# Patient Record
Sex: FEMALE | Race: BLACK OR AFRICAN AMERICAN
[De-identification: names, ages, dates, MRNs, and addresses within clinical notes are randomized per-mention and may not be internally consistent; named-entity substitution may affect disease eponyms.]

---

## 2018-12-03 PROBLEM — Z00.00 ENCOUNTER FOR PREVENTIVE HEALTH EXAMINATION: Status: ACTIVE | Noted: 2018-12-03

## 2019-06-20 ENCOUNTER — APPOINTMENT (OUTPATIENT)
Dept: VASCULAR SURGERY | Facility: CLINIC | Age: 69
End: 2019-06-20
Payer: MEDICARE

## 2019-06-20 VITALS
BODY MASS INDEX: 26.36 KG/M2 | HEIGHT: 66 IN | TEMPERATURE: 98.1 F | SYSTOLIC BLOOD PRESSURE: 145 MMHG | HEART RATE: 71 BPM | WEIGHT: 164 LBS | DIASTOLIC BLOOD PRESSURE: 87 MMHG

## 2019-06-20 DIAGNOSIS — I65.23 OCCLUSION AND STENOSIS OF BILATERAL CAROTID ARTERIES: ICD-10-CM

## 2019-06-20 PROCEDURE — 93880 EXTRACRANIAL BILAT STUDY: CPT

## 2019-06-20 PROCEDURE — 99203 OFFICE O/P NEW LOW 30 MIN: CPT

## 2020-06-25 ENCOUNTER — APPOINTMENT (OUTPATIENT)
Dept: VASCULAR SURGERY | Facility: CLINIC | Age: 70
End: 2020-06-25

## 2020-07-06 ENCOUNTER — APPOINTMENT (OUTPATIENT)
Dept: VASCULAR SURGERY | Facility: CLINIC | Age: 70
End: 2020-07-06
Payer: MEDICARE

## 2020-07-06 PROCEDURE — 93970 EXTREMITY STUDY: CPT

## 2020-07-06 PROCEDURE — 93978 VASCULAR STUDY: CPT

## 2020-07-10 ENCOUNTER — APPOINTMENT (OUTPATIENT)
Dept: VASCULAR SURGERY | Facility: CLINIC | Age: 70
End: 2020-07-10
Payer: MEDICARE

## 2020-07-10 DIAGNOSIS — Z86.39 PERSONAL HISTORY OF OTHER ENDOCRINE, NUTRITIONAL AND METABOLIC DISEASE: ICD-10-CM

## 2020-07-10 DIAGNOSIS — Z86.79 PERSONAL HISTORY OF OTHER DISEASES OF THE CIRCULATORY SYSTEM: ICD-10-CM

## 2020-07-10 DIAGNOSIS — Z87.891 PERSONAL HISTORY OF NICOTINE DEPENDENCE: ICD-10-CM

## 2020-07-10 DIAGNOSIS — M79.89 OTHER SPECIFIED SOFT TISSUE DISORDERS: ICD-10-CM

## 2020-07-10 PROCEDURE — 99447 NTRPROF PH1/NTRNET/EHR 11-20: CPT

## 2020-07-10 RX ORDER — AMLODIPINE BESYLATE 5 MG/1
TABLET ORAL
Refills: 0 | Status: ACTIVE | COMMUNITY

## 2020-07-10 RX ORDER — ERGOCALCIFEROL 1.25 MG/1
CAPSULE ORAL
Refills: 0 | Status: ACTIVE | COMMUNITY

## 2020-07-10 RX ORDER — OLIVE LEAF EXTRACT 250 MG
CAPSULE ORAL
Refills: 0 | Status: ACTIVE | COMMUNITY

## 2020-07-10 RX ORDER — ATORVASTATIN CALCIUM 80 MG/1
TABLET, FILM COATED ORAL
Refills: 0 | Status: ACTIVE | COMMUNITY

## 2020-07-13 NOTE — ASSESSMENT
[FreeTextEntry1] : SAMRA JAMISON is a 70 year old female with h/o LE swelling. No evidence of venous insufficiency or blood clots. Past vascular w/u have otherwise been unremarkable. Patient is doing well CS and does not have any other active vascular issues. Recommend f/u as needed.

## 2020-07-13 NOTE — REVIEW OF SYSTEMS
[Lower Ext Edema] : lower extremity edema [Negative] : Constitutional [Chest Pain] : no chest pain [Shortness Of Breath] : no shortness of breath [Abdominal Pain] : no abdominal pain [Leg Claudication] : no intermittent leg claudication [Limb Weakness] : no limb weakness [Difficulty Walking] : no difficulty walking

## 2020-07-13 NOTE — HISTORY OF PRESENT ILLNESS
[Medical Office: ([facility])___] : at the medical office located in  [Home] : at home, [unfilled] , at the time of the visit. [Verbal consent obtained from patient] : the patient, [unfilled] [FreeTextEntry1] : SAMRA JAMISON is a 70 year old female, former patient of Dr. Ventura, with a h/o LE swelling. Today she reports ankle swelling for past 2 months. She has been wearing CS with some improvement in symptoms. She also take horse chestnut supplements which reportedly help with swelling/discomfort. She otherwise denies leg pain, difficulty walking, wounds, and h/o DVT. No FH of aneurysms. \par \par Her recent aortoiliac ultrasound was negative for aneurysms and atherosclerotic lesions. Bilateral VLE was negative for DVT/SVT/reflux. Previous carotid duplex at our office with mild disease bilaterally.

## 2022-12-05 ENCOUNTER — OFFICE (OUTPATIENT)
Dept: URBAN - METROPOLITAN AREA CLINIC 90 | Facility: CLINIC | Age: 72
Setting detail: OPHTHALMOLOGY
End: 2022-12-05
Payer: MEDICARE

## 2022-12-05 DIAGNOSIS — H11.133: ICD-10-CM

## 2022-12-05 DIAGNOSIS — H10.45: ICD-10-CM

## 2022-12-05 DIAGNOSIS — H43.391: ICD-10-CM

## 2022-12-05 DIAGNOSIS — H40.013: ICD-10-CM

## 2022-12-05 DIAGNOSIS — H16.223: ICD-10-CM

## 2022-12-05 DIAGNOSIS — H02.834: ICD-10-CM

## 2022-12-05 DIAGNOSIS — H25.13: ICD-10-CM

## 2022-12-05 DIAGNOSIS — H02.831: ICD-10-CM

## 2022-12-05 DIAGNOSIS — H35.373: ICD-10-CM

## 2022-12-05 PROCEDURE — 92014 COMPRE OPH EXAM EST PT 1/>: CPT | Performed by: OPHTHALMOLOGY

## 2022-12-05 PROCEDURE — 92250 FUNDUS PHOTOGRAPHY W/I&R: CPT | Performed by: OPHTHALMOLOGY

## 2022-12-05 ASSESSMENT — VISUAL ACUITY
OS_BCVA: 20/50-1
OD_BCVA: 20/25-2

## 2022-12-05 ASSESSMENT — REFRACTION_CURRENTRX
OS_ADD: +3.00
OS_SPHERE: +0.25
OS_AXIS: 121
OD_VPRISM_DIRECTION: PROGS
OD_CYLINDER: -1.00
OS_AXIS: 091
OD_AXIS: 003
OS_CYLINDER: SPHERE
OD_SPHERE: -7.75
OD_ADD: +2.25
OS_ADD: +3.00
OS_ADD: -2.50
OD_OVR_VA: 20/
OD_VPRISM_DIRECTION: PROGS
OD_VPRISM_DIRECTION: PROGS
OS_VPRISM_DIRECTION: PROGS
OD_ADD: +3.25
OD_AXIS: 030
OS_AXIS: 107
OD_CYLINDER: -0.50
OD_CYLINDER: -0.75
OS_ADD: +3.00
OD_AXIS: 178
OD_SPHERE: -7.50
OS_CYLINDER: -0.25
OD_CYLINDER: -0.50
OS_OVR_VA: 20/
OD_OVR_VA: 20/
OD_OVR_VA: 20/
OS_SPHERE: -6.00
OD_ADD: +3.00
OS_OVR_VA: 20/
OD_SPHERE: -7.75
OS_CYLINDER: -0.25
OS_VPRISM_DIRECTION: PROGS
OD_SPHERE: -7.50
OS_VPRISM_DIRECTION: PROGS
OS_SPHERE: -6.00
OS_VPRISM_DIRECTION: PROGS
OD_AXIS: 169
OD_ADD: +2.75
OS_SPHERE: -6.00
OS_OVR_VA: 20/
OS_CYLINDER: -0.25
OD_VPRISM_DIRECTION: PROGS

## 2022-12-05 ASSESSMENT — SPHEQUIV_DERIVED
OS_SPHEQUIV: -3.5
OD_SPHEQUIV: -4.75
OD_SPHEQUIV: -4.375
OD_SPHEQUIV: -7.125
OD_SPHEQUIV: -6.875
OS_SPHEQUIV: -6.375
OS_SPHEQUIV: -5.75
OD_SPHEQUIV: -7.75

## 2022-12-05 ASSESSMENT — REFRACTION_MANIFEST
OD_AXIS: 15
OD_CYLINDER: -0.50
OD_AXIS: 015
OS_SPHERE: -6.25
OD_VA1: 20/20
OD_ADD: +2.75
OS_VA1: 20/20-1
OS_SPHERE: -2.75
OD_CYLINDER: -0.50
OS_CYLINDER: SPHERE
OS_ADD: +3.00
OS_AXIS: 110
OD_AXIS: 010
OS_AXIS: 110
OS_SPHERE: -6.00
OD_SPHERE: -7.50
OD_VA1: 20/25+1
OS_SPHERE: -3.25
OU_VA: 20/20
OD_CYLINDER: -0.75
OU_VA: 20/20
OS_VA1: 20/20
OS_CYLINDER: -0.50
OD_SPHERE: -6.75
OD_AXIS: 010
OD_SPHERE: -4.00
OD_CYLINDER: -0.75
OS_CYLINDER: -0.25
OD_SPHERE: -4.50

## 2022-12-05 ASSESSMENT — SUPERFICIAL PUNCTATE KERATITIS (SPK)
OS_SPK: 1+ 2+
OD_SPK: 1+ 2+

## 2022-12-05 ASSESSMENT — PACHYMETRY
OD_CT_UM: 617
OD_CT_CORRECTION: -5
OS_CT_CORRECTION: -3
OS_CT_UM: 587

## 2022-12-05 ASSESSMENT — KERATOMETRY
OD_K1POWER_DIOPTERS: 43.00
OD_AXISANGLE_DEGREES: 082
OS_K1POWER_DIOPTERS: 43.50
OS_AXISANGLE_DEGREES: 125
OD_K2POWER_DIOPTERS: 44.25
METHOD_AUTO_MANUAL: AUTO
OS_K2POWER_DIOPTERS: 44.00

## 2022-12-05 ASSESSMENT — AXIALLENGTH_DERIVED
OS_AL: 24.9362
OD_AL: 25.5461
OD_AL: 25.3759
OD_AL: 26.5552
OS_AL: 25.9555
OD_AL: 26.9943
OS_AL: 26.2537
OD_AL: 26.6792

## 2022-12-05 ASSESSMENT — LID POSITION - DERMATOCHALASIS
OS_DERMATOCHALASIS: LUL
OD_DERMATOCHALASIS: RUL

## 2022-12-05 ASSESSMENT — CONFRONTATIONAL VISUAL FIELD TEST (CVF)
OS_FINDINGS: FULL
OD_FINDINGS: FULL

## 2022-12-05 ASSESSMENT — TONOMETRY
OS_IOP_MMHG: 15
OD_IOP_MMHG: 14

## 2022-12-05 ASSESSMENT — REFRACTION_AUTOREFRACTION
OD_AXIS: 018
OS_SPHERE: -5.25
OS_CYLINDER: -1.00
OD_SPHERE: -6.50
OS_AXIS: 101
OD_CYLINDER: -0.75

## 2023-06-09 ENCOUNTER — OFFICE (OUTPATIENT)
Dept: URBAN - METROPOLITAN AREA CLINIC 90 | Facility: CLINIC | Age: 73
Setting detail: OPHTHALMOLOGY
End: 2023-06-09
Payer: MEDICARE

## 2023-06-09 DIAGNOSIS — H40.013: ICD-10-CM

## 2023-06-09 DIAGNOSIS — H11.133: ICD-10-CM

## 2023-06-09 DIAGNOSIS — H16.223: ICD-10-CM

## 2023-06-09 DIAGNOSIS — H10.45: ICD-10-CM

## 2023-06-09 DIAGNOSIS — H25.13: ICD-10-CM

## 2023-06-09 DIAGNOSIS — H02.831: ICD-10-CM

## 2023-06-09 DIAGNOSIS — H02.834: ICD-10-CM

## 2023-06-09 PROCEDURE — 92083 EXTENDED VISUAL FIELD XM: CPT | Performed by: OPHTHALMOLOGY

## 2023-06-09 PROCEDURE — 92133 CPTRZD OPH DX IMG PST SGM ON: CPT | Performed by: OPHTHALMOLOGY

## 2023-06-09 PROCEDURE — 92020 GONIOSCOPY: CPT | Performed by: OPHTHALMOLOGY

## 2023-06-09 PROCEDURE — 92012 INTRM OPH EXAM EST PATIENT: CPT | Performed by: OPHTHALMOLOGY

## 2023-06-09 ASSESSMENT — LID POSITION - DERMATOCHALASIS
OD_DERMATOCHALASIS: RUL
OS_DERMATOCHALASIS: LUL

## 2023-06-09 ASSESSMENT — CONFRONTATIONAL VISUAL FIELD TEST (CVF)
OS_FINDINGS: FULL
OD_FINDINGS: FULL

## 2023-06-09 ASSESSMENT — TONOMETRY
OS_IOP_MMHG: 17
OD_IOP_MMHG: 16

## 2023-06-09 ASSESSMENT — PACHYMETRY
OD_CT_CORRECTION: -5
OD_CT_UM: 617
OS_CT_UM: 587
OS_CT_CORRECTION: -3

## 2023-06-10 ASSESSMENT — REFRACTION_MANIFEST
OS_SPHERE: -6.25
OS_SPHERE: -2.75
OD_AXIS: 010
OS_CYLINDER: -0.25
OD_AXIS: 15
OS_SPHERE: -6.00
OD_AXIS: 15
OD_ADD: +2.75
OD_CYLINDER: -0.75
OD_CYLINDER: -0.50
OU_VA: 20/20
OS_AXIS: 110
OD_VA1: 20/25+1
OS_VA1: 20/20-1
OD_CYLINDER: -0.50
OS_CYLINDER: SPHERE
OD_VA1: 20/20
OD_SPHERE: -4.00
OD_SPHERE: -7.50
OS_SPHERE: -3.25
OD_SPHERE: -6.75
OS_CYLINDER: SPH
OD_SPHERE: -7.50
OD_AXIS: 010
OS_VA1: 20/20
OS_AXIS: 110
OD_CYLINDER: -0.75
OD_SPHERE: -4.50
OS_SPHERE: -6.00
OS_CYLINDER: -0.50
OD_AXIS: 015
OD_CYLINDER: -0.50
OU_VA: 20/20
OS_ADD: +3.00

## 2023-06-10 ASSESSMENT — REFRACTION_CURRENTRX
OD_CYLINDER: -0.75
OD_SPHERE: -7.75
OS_VPRISM_DIRECTION: PROGS
OS_ADD: -2.50
OS_CYLINDER: -0.25
OD_AXIS: 178
OS_OVR_VA: 20/
OS_OVR_VA: 20/
OD_AXIS: 169
OD_VPRISM_DIRECTION: PROGS
OD_CYLINDER: -0.50
OS_VPRISM_DIRECTION: PROGS
OD_VPRISM_DIRECTION: PROGS
OD_ADD: +2.75
OS_AXIS: 107
OD_OVR_VA: 20/
OD_AXIS: 003
OS_SPHERE: +0.25
OD_VPRISM_DIRECTION: PROGS
OS_CYLINDER: -0.25
OD_SPHERE: -7.50
OD_SPHERE: -7.50
OS_ADD: +3.00
OS_ADD: +3.00
OD_ADD: +3.00
OS_ADD: +3.00
OS_OVR_VA: 20/
OS_VPRISM_DIRECTION: PROGS
OD_CYLINDER: -0.50
OS_CYLINDER: SPHERE
OD_CYLINDER: -1.00
OS_AXIS: 121
OS_SPHERE: -6.00
OD_OVR_VA: 20/
OD_SPHERE: -7.75
OD_AXIS: 030
OS_SPHERE: -6.00
OD_ADD: +2.25
OS_AXIS: 091
OD_ADD: +3.25
OD_VPRISM_DIRECTION: PROGS
OS_VPRISM_DIRECTION: PROGS
OS_SPHERE: -6.00
OS_CYLINDER: -0.25
OD_OVR_VA: 20/

## 2023-06-10 ASSESSMENT — REFRACTION_AUTOREFRACTION
OS_CYLINDER: -1.00
OD_AXIS: 018
OS_AXIS: 101
OD_CYLINDER: -0.75
OS_SPHERE: -5.25
OD_SPHERE: -6.50

## 2023-06-10 ASSESSMENT — SPHEQUIV_DERIVED
OD_SPHEQUIV: -7.75
OD_SPHEQUIV: -6.875
OD_SPHEQUIV: -4.75
OD_SPHEQUIV: -7.125
OD_SPHEQUIV: -4.375
OS_SPHEQUIV: -6.375
OS_SPHEQUIV: -3.5
OS_SPHEQUIV: -5.75
OD_SPHEQUIV: -7.75

## 2023-06-10 ASSESSMENT — KERATOMETRY
OD_K1POWER_DIOPTERS: 43.00
OS_K2POWER_DIOPTERS: 44.00
OS_K1POWER_DIOPTERS: 43.50
OS_AXISANGLE_DEGREES: 125
OD_K2POWER_DIOPTERS: 44.25
METHOD_AUTO_MANUAL: AUTO
OD_AXISANGLE_DEGREES: 082

## 2023-06-10 ASSESSMENT — VISUAL ACUITY
OD_BCVA: 20/25
OS_BCVA: 20/30-2

## 2023-06-10 ASSESSMENT — AXIALLENGTH_DERIVED
OD_AL: 26.9943
OS_AL: 25.9555
OS_AL: 26.2537
OD_AL: 26.5552
OD_AL: 26.9943
OD_AL: 26.6792
OS_AL: 24.9362
OD_AL: 25.3759
OD_AL: 25.5461

## 2023-12-13 ENCOUNTER — OFFICE (OUTPATIENT)
Dept: URBAN - METROPOLITAN AREA CLINIC 90 | Facility: CLINIC | Age: 73
Setting detail: OPHTHALMOLOGY
End: 2023-12-13
Payer: MEDICARE

## 2023-12-13 DIAGNOSIS — H02.834: ICD-10-CM

## 2023-12-13 DIAGNOSIS — H40.013: ICD-10-CM

## 2023-12-13 DIAGNOSIS — H02.831: ICD-10-CM

## 2023-12-13 DIAGNOSIS — H35.373: ICD-10-CM

## 2023-12-13 DIAGNOSIS — H11.133: ICD-10-CM

## 2023-12-13 DIAGNOSIS — H25.13: ICD-10-CM

## 2023-12-13 DIAGNOSIS — H43.393: ICD-10-CM

## 2023-12-13 DIAGNOSIS — H16.223: ICD-10-CM

## 2023-12-13 PROCEDURE — 92014 COMPRE OPH EXAM EST PT 1/>: CPT | Performed by: OPHTHALMOLOGY

## 2023-12-13 PROCEDURE — 92250 FUNDUS PHOTOGRAPHY W/I&R: CPT | Performed by: OPHTHALMOLOGY

## 2023-12-13 ASSESSMENT — LID POSITION - DERMATOCHALASIS
OD_DERMATOCHALASIS: RUL
OS_DERMATOCHALASIS: LUL

## 2023-12-13 ASSESSMENT — REFRACTION_CURRENTRX
OS_SPHERE: -6.00
OS_ADD: +3.00
OD_OVR_VA: 20/
OD_CYLINDER: -0.50
OD_ADD: +2.75
OS_SPHERE: +0.25
OS_ADD: -2.50
OS_CYLINDER: -0.25
OS_VPRISM_DIRECTION: PROGS
OS_SPHERE: -6.00
OS_SPHERE: -6.25
OS_CYLINDER: -0.25
OD_SPHERE: -7.50
OS_AXIS: 107
OS_OVR_VA: 20/
OD_ADD: +2.25
OD_CYLINDER: -0.50
OD_VPRISM_DIRECTION: PROGS
OS_VPRISM_DIRECTION: PROGS
OD_ADD: +3.25
OS_ADD: +3.75
OD_AXIS: 010
OD_VPRISM_DIRECTION: PROGS
OS_OVR_VA: 20/
OS_VPRISM_DIRECTION: PROGS
OD_SPHERE: -7.75
OS_VPRISM_DIRECTION: PROGS
OD_AXIS: 003
OS_AXIS: 180
OD_CYLINDER: -0.50
OD_VPRISM_DIRECTION: PROGS
OS_ADD: +3.00
OD_VPRISM_DIRECTION: PROGS
OD_AXIS: 169
OS_OVR_VA: 20/
OD_OVR_VA: 20/
OS_AXIS: 121
OS_CYLINDER: SPH
OS_CYLINDER: -0.25
OD_OVR_VA: 20/
OD_ADD: +3.75
OD_CYLINDER: -0.75
OS_AXIS: 091
OD_AXIS: 030
OD_SPHERE: -7.75
OD_SPHERE: -7.50

## 2023-12-13 ASSESSMENT — REFRACTION_MANIFEST
OS_SPHERE: -2.75
OU_VA: 20/20
OS_AXIS: 110
OS_SPHERE: -6.00
OS_CYLINDER: -0.25
OD_VA1: 20/20
OS_CYLINDER: SPH
OD_AXIS: 015
OS_AXIS: 110
OU_VA: 20/20
OD_AXIS: 15
OS_VA1: 20/20-1
OS_SPHERE: -6.00
OD_CYLINDER: -0.50
OD_CYLINDER: -0.50
OD_SPHERE: -7.50
OD_SPHERE: -4.00
OS_ADD: +3.00
OS_SPHERE: -6.00
OD_VA1: 20/25+1
OD_CYLINDER: -0.75
OD_AXIS: 15
OS_SPHERE: -6.25
OS_CYLINDER: SPHERE
OD_CYLINDER: -0.50
OD_ADD: +2.75
OS_ADD: +3.00
OD_SPHERE: -4.50
OS_VA1: 20/20
OD_AXIS: 010
OD_AXIS: 15
OD_SPHERE: -7.50
OD_SPHERE: -7.50
OS_SPHERE: -3.25
OD_CYLINDER: -0.75
OD_CYLINDER: -0.50
OS_CYLINDER: -0.50
OD_SPHERE: -6.75
OD_AXIS: 010
OD_ADD: +2.75

## 2023-12-13 ASSESSMENT — SPHEQUIV_DERIVED
OS_SPHEQUIV: -6.375
OD_SPHEQUIV: -7
OS_SPHEQUIV: -3.5
OD_SPHEQUIV: -7.125
OD_SPHEQUIV: -4.75
OS_SPHEQUIV: -5.625
OD_SPHEQUIV: -7.75
OD_SPHEQUIV: -7.75
OD_SPHEQUIV: -4.375
OD_SPHEQUIV: -7.75

## 2023-12-13 ASSESSMENT — REFRACTION_AUTOREFRACTION
OS_CYLINDER: -0.75
OD_CYLINDER: -1.00
OD_SPHERE: -6.50
OD_AXIS: 009
OS_AXIS: 105
OS_SPHERE: -5.25

## 2023-12-13 ASSESSMENT — CONFRONTATIONAL VISUAL FIELD TEST (CVF)
OS_FINDINGS: FULL
OD_FINDINGS: FULL

## 2024-06-21 ENCOUNTER — OFFICE (OUTPATIENT)
Dept: URBAN - METROPOLITAN AREA CLINIC 90 | Facility: CLINIC | Age: 74
Setting detail: OPHTHALMOLOGY
End: 2024-06-21
Payer: MEDICARE

## 2024-06-21 DIAGNOSIS — H35.373: ICD-10-CM

## 2024-06-21 DIAGNOSIS — H11.133: ICD-10-CM

## 2024-06-21 DIAGNOSIS — H40.013: ICD-10-CM

## 2024-06-21 DIAGNOSIS — H02.834: ICD-10-CM

## 2024-06-21 DIAGNOSIS — H43.393: ICD-10-CM

## 2024-06-21 DIAGNOSIS — H16.223: ICD-10-CM

## 2024-06-21 DIAGNOSIS — H02.831: ICD-10-CM

## 2024-06-21 DIAGNOSIS — H25.13: ICD-10-CM

## 2024-06-21 PROCEDURE — 92014 COMPRE OPH EXAM EST PT 1/>: CPT | Performed by: OPHTHALMOLOGY

## 2024-06-21 PROCEDURE — 92133 CPTRZD OPH DX IMG PST SGM ON: CPT | Performed by: OPHTHALMOLOGY

## 2024-06-21 PROCEDURE — 92083 EXTENDED VISUAL FIELD XM: CPT | Performed by: OPHTHALMOLOGY

## 2024-06-21 ASSESSMENT — LID POSITION - DERMATOCHALASIS
OD_DERMATOCHALASIS: RUL
OS_DERMATOCHALASIS: LUL

## 2024-06-21 ASSESSMENT — CONFRONTATIONAL VISUAL FIELD TEST (CVF)
OS_FINDINGS: FULL
OD_FINDINGS: FULL

## 2024-12-23 ENCOUNTER — OFFICE (OUTPATIENT)
Facility: LOCATION | Age: 74
Setting detail: OPHTHALMOLOGY
End: 2024-12-23
Payer: MEDICARE

## 2024-12-23 DIAGNOSIS — H02.831: ICD-10-CM

## 2024-12-23 DIAGNOSIS — H35.373: ICD-10-CM

## 2024-12-23 DIAGNOSIS — H11.133: ICD-10-CM

## 2024-12-23 DIAGNOSIS — H02.834: ICD-10-CM

## 2024-12-23 DIAGNOSIS — H43.393: ICD-10-CM

## 2024-12-23 DIAGNOSIS — H25.13: ICD-10-CM

## 2024-12-23 DIAGNOSIS — H40.013: ICD-10-CM

## 2024-12-23 DIAGNOSIS — H16.223: ICD-10-CM

## 2024-12-23 PROBLEM — H43.391 VITREOUS FLOATERS; RIGHT EYE: Status: ACTIVE | Noted: 2024-12-23

## 2024-12-23 PROCEDURE — 92250 FUNDUS PHOTOGRAPHY W/I&R: CPT | Performed by: OPHTHALMOLOGY

## 2024-12-23 PROCEDURE — 92014 COMPRE OPH EXAM EST PT 1/>: CPT | Performed by: OPHTHALMOLOGY

## 2024-12-23 ASSESSMENT — REFRACTION_CURRENTRX
OD_SPHERE: -7.75
OD_VPRISM_DIRECTION: PROGS
OD_AXIS: 010
OS_OVR_VA: 20/
OS_CYLINDER: -0.25
OS_SPHERE: -6.00
OD_ADD: +2.75
OD_OVR_VA: 20/
OS_ADD: +2.50
OS_SPHERE: -6.00
OD_VPRISM_DIRECTION: PROGS
OS_VPRISM_DIRECTION: PROGS
OS_AXIS: 180
OS_SPHERE: +0.25
OD_OVR_VA: 20/
OD_SPHERE: -7.75
OD_CYLINDER: -0.50
OD_AXIS: 013
OS_CYLINDER: SPH
OS_AXIS: 107
OS_AXIS: 091
OD_SPHERE: -7.50
OD_ADD: +2.50
OD_OVR_VA: 20/
OD_CYLINDER: -0.50
OD_AXIS: 169
OS_CYLINDER: -0.25
OS_VPRISM_DIRECTION: PROGS
OS_SPHERE: -6.25
OS_ADD: +3.75
OD_SPHERE: -7.50
OD_SPHERE: -7.75
OD_CYLINDER: -0.50
OS_ADD: -2.50
OD_ADD: +3.75
OD_CYLINDER: -0.75
OD_ADD: +2.25
OS_VPRISM_DIRECTION: PROGS
OD_AXIS: 030
OD_VPRISM_DIRECTION: PROGS
OS_VPRISM_DIRECTION: PROGS
OS_CYLINDER: -SPH
OS_CYLINDER: -0.25
OS_SPHERE: -6.00
OD_ADD: +3.25
OD_VPRISM_DIRECTION: PROGS
OS_OVR_VA: 20/
OS_ADD: +3.00
OD_CYLINDER: -0.75
OD_AXIS: 003
OS_OVR_VA: 20/
OS_AXIS: 121
OS_ADD: +3.00

## 2024-12-23 ASSESSMENT — REFRACTION_MANIFEST
OD_ADD: +2.75
OS_ADD: +3.00
OD_CYLINDER: -0.75
OD_ADD: +2.75
OU_VA: 20/20
OS_CYLINDER: -0.50
OS_VA1: 20/20
OD_SPHERE: -6.75
OD_CYLINDER: -0.50
OD_SPHERE: -7.50
OS_SPHERE: -6.00
OS_SPHERE: -6.00
OD_SPHERE: -4.00
OD_CYLINDER: -0.50
OS_CYLINDER: SPHERE
OU_VA: 20/20
OS_AXIS: 110
OD_AXIS: 15
OD_AXIS: 010
OD_SPHERE: -7.50
OD_CYLINDER: -0.50
OD_SPHERE: -7.50
OD_VA1: 20/20
OS_CYLINDER: -0.25
OD_SPHERE: -7.50
OD_AXIS: 15
OD_AXIS: 15
OS_VA1: 20/20-1
OD_CYLINDER: -0.75
OS_AXIS: 110
OD_AXIS: 015
OD_SPHERE: -4.50
OD_CYLINDER: -0.50
OS_SPHERE: -2.75
OS_SPHERE: -3.25
OS_SPHERE: -6.00
OS_SPHERE: -6.00
OS_ADD: +3.00
OS_CYLINDER: SPH
OD_AXIS: 010
OD_VA1: 20/25+1
OS_SPHERE: -6.25
OD_ADD: +2.75
OD_CYLINDER: -0.50
OD_AXIS: 15
OS_ADD: +3.00

## 2024-12-23 ASSESSMENT — CONFRONTATIONAL VISUAL FIELD TEST (CVF)
OS_FINDINGS: FULL
OD_FINDINGS: FULL

## 2024-12-23 ASSESSMENT — REFRACTION_AUTOREFRACTION
OS_AXIS: 107
OD_AXIS: 018
OS_SPHERE: -5.00
OD_SPHERE: -6.50
OD_CYLINDER: -0.75
OS_CYLINDER: -1.00

## 2024-12-23 ASSESSMENT — KERATOMETRY
OS_K2POWER_DIOPTERS: 44.25
OD_K2POWER_DIOPTERS: 44.25
OD_AXISANGLE_DEGREES: 083
OD_K1POWER_DIOPTERS: 43.25
METHOD_AUTO_MANUAL: AUTO
OS_K1POWER_DIOPTERS: 43.75
OS_AXISANGLE_DEGREES: 161

## 2024-12-23 ASSESSMENT — SUPERFICIAL PUNCTATE KERATITIS (SPK)
OD_SPK: T
OS_SPK: T

## 2024-12-23 ASSESSMENT — TONOMETRY
OD_IOP_MMHG: 19
OS_IOP_MMHG: 18
OS_IOP_MMHG: 19
OD_IOP_MMHG: 18

## 2024-12-23 ASSESSMENT — PACHYMETRY
OD_CT_CORRECTION: -5
OD_CT_UM: 617
OS_CT_CORRECTION: -3
OS_CT_UM: 587

## 2024-12-23 ASSESSMENT — LID POSITION - DERMATOCHALASIS
OD_DERMATOCHALASIS: RUL
OS_DERMATOCHALASIS: LUL

## 2024-12-23 ASSESSMENT — VISUAL ACUITY
OD_BCVA: 20/20-2
OS_BCVA: 20/25

## 2025-06-05 ENCOUNTER — APPOINTMENT (OUTPATIENT)
Dept: VASCULAR SURGERY | Facility: CLINIC | Age: 75
End: 2025-06-05
Payer: MEDICARE

## 2025-06-05 VITALS
DIASTOLIC BLOOD PRESSURE: 87 MMHG | HEIGHT: 66 IN | SYSTOLIC BLOOD PRESSURE: 140 MMHG | HEART RATE: 91 BPM | BODY MASS INDEX: 27.32 KG/M2 | WEIGHT: 170 LBS | TEMPERATURE: 98.2 F

## 2025-06-05 PROCEDURE — 99203 OFFICE O/P NEW LOW 30 MIN: CPT

## 2025-06-05 PROCEDURE — 93971 EXTREMITY STUDY: CPT

## 2025-06-12 ENCOUNTER — OFFICE (OUTPATIENT)
Facility: LOCATION | Age: 75
Setting detail: OPHTHALMOLOGY
End: 2025-06-12
Payer: MEDICARE

## 2025-06-12 DIAGNOSIS — H01.002: ICD-10-CM

## 2025-06-12 DIAGNOSIS — H40.013: ICD-10-CM

## 2025-06-12 DIAGNOSIS — H02.831: ICD-10-CM

## 2025-06-12 DIAGNOSIS — H01.005: ICD-10-CM

## 2025-06-12 DIAGNOSIS — H43.391: ICD-10-CM

## 2025-06-12 DIAGNOSIS — H35.373: ICD-10-CM

## 2025-06-12 DIAGNOSIS — H16.223: ICD-10-CM

## 2025-06-12 DIAGNOSIS — H01.004: ICD-10-CM

## 2025-06-12 DIAGNOSIS — H01.001: ICD-10-CM

## 2025-06-12 DIAGNOSIS — H25.13: ICD-10-CM

## 2025-06-12 DIAGNOSIS — H02.834: ICD-10-CM

## 2025-06-12 DIAGNOSIS — H11.133: ICD-10-CM

## 2025-06-12 PROCEDURE — 92133 CPTRZD OPH DX IMG PST SGM ON: CPT | Performed by: OPHTHALMOLOGY

## 2025-06-12 PROCEDURE — 92083 EXTENDED VISUAL FIELD XM: CPT | Performed by: OPHTHALMOLOGY

## 2025-06-12 PROCEDURE — 92014 COMPRE OPH EXAM EST PT 1/>: CPT | Performed by: OPHTHALMOLOGY

## 2025-06-12 ASSESSMENT — REFRACTION_CURRENTRX
OS_ADD: -2.50
OS_OVR_VA: 20/
OD_ADD: +2.25
OS_VPRISM_DIRECTION: PROGS
OD_SPHERE: -7.75
OS_CYLINDER: -0.25
OS_CYLINDER: -0.25
OD_CYLINDER: -0.50
OD_OVR_VA: 20/
OS_VPRISM_DIRECTION: PROGS
OD_AXIS: 022
OD_SPHERE: -7.50
OD_ADD: +3.75
OD_CYLINDER: -0.50
OS_AXIS: 091
OS_ADD: +3.00
OS_AXIS: 095
OS_SPHERE: -6.00
OS_CYLINDER: -0.75
OD_CYLINDER: -0.75
OD_SPHERE: -7.75
OD_VPRISM_DIRECTION: PROGS
OS_VPRISM_DIRECTION: PROGS
OS_SPHERE: -6.25
OS_SPHERE: -6.00
OD_AXIS: 169
OS_CYLINDER: SPH
OD_SPHERE: -7.50
OS_VPRISM_DIRECTION: PROGS
OD_ADD: +2.75
OD_VPRISM_DIRECTION: PROGS
OS_SPHERE: +0.25
OD_CYLINDER: -0.75
OD_AXIS: 013
OD_ADD: +2.50
OS_CYLINDER: -SPH
OS_SPHERE: -6.00
OD_SPHERE: -7.50
OD_VPRISM_DIRECTION: PROGS
OD_CYLINDER: -0.50
OD_OVR_VA: 20/
OD_VPRISM_DIRECTION: PROGS
OD_VPRISM_DIRECTION: PROGS
OD_CYLINDER: -0.50
OD_ADD: +3.25
OS_CYLINDER: -0.25
OD_SPHERE: -7.75
OS_SPHERE: -6.25
OD_AXIS: 010
OD_AXIS: 003
OD_OVR_VA: 20/
OD_ADD: +2.50
OS_AXIS: 107
OS_ADD: +2.50
OD_AXIS: 030
OS_AXIS: 121
OS_ADD: +3.75
OS_VPRISM_DIRECTION: PROGS
OS_ADD: +2.50
OS_AXIS: 180
OS_ADD: +3.00
OS_OVR_VA: 20/
OS_OVR_VA: 20/

## 2025-06-12 ASSESSMENT — REFRACTION_MANIFEST
OD_VA1: 20/25+1
OS_ADD: +3.00
OD_ADD: +2.75
OD_CYLINDER: -0.50
OD_CYLINDER: -0.50
OD_ADD: +2.75
OD_AXIS: 15
OS_SPHERE: -6.25
OD_CYLINDER: -0.75
OS_ADD: +3.00
OD_SPHERE: -7.50
OS_CYLINDER: SPHERE
OD_AXIS: 015
OS_SPHERE: -6.00
OU_VA: 20/20
OS_CYLINDER: -0.25
OD_AXIS: 15
OS_SPHERE: -6.00
OD_SPHERE: -4.00
OS_ADD: +3.00
OS_CYLINDER: -0.50
OD_ADD: +2.75
OD_SPHERE: -6.75
OD_CYLINDER: -0.50
OD_SPHERE: -4.50
OD_CYLINDER: -0.50
OU_VA: 20/20
OD_AXIS: 15
OD_SPHERE: -7.50
OD_SPHERE: -7.50
OS_CYLINDER: SPH
OD_AXIS: 15
OD_CYLINDER: -0.75
OS_SPHERE: -6.00
OS_AXIS: 110
OS_VA1: 20/20
OD_SPHERE: -7.50
OD_AXIS: 010
OS_SPHERE: -3.25
OD_AXIS: 010
OS_AXIS: 110
OS_SPHERE: -2.75
OS_SPHERE: -6.00
OS_VA1: 20/20-1
OD_CYLINDER: -0.50
OD_VA1: 20/20

## 2025-06-12 ASSESSMENT — VISUAL ACUITY
OD_BCVA: 20/20-2
OS_BCVA: 20/30-2

## 2025-06-12 ASSESSMENT — KERATOMETRY
OS_AXISANGLE_DEGREES: 159
OD_K1POWER_DIOPTERS: 43.25
OD_K2POWER_DIOPTERS: 44.25
OS_K2POWER_DIOPTERS: 44.00
OS_K1POWER_DIOPTERS: 43.50
OD_AXISANGLE_DEGREES: 076

## 2025-06-12 ASSESSMENT — SUPERFICIAL PUNCTATE KERATITIS (SPK)
OD_SPK: T
OS_SPK: T

## 2025-06-12 ASSESSMENT — REFRACTION_AUTOREFRACTION
OD_SPHERE: -6.50
OD_CYLINDER: -0.50
OS_CYLINDER: -0.75
OS_AXIS: 114
OD_AXIS: 006
OS_SPHERE: -5.25

## 2025-06-12 ASSESSMENT — PACHYMETRY
OD_CT_CORRECTION: -5
OS_CT_UM: 587
OD_CT_UM: 617
OS_CT_CORRECTION: -3

## 2025-06-12 ASSESSMENT — CONFRONTATIONAL VISUAL FIELD TEST (CVF)
OD_FINDINGS: FULL
OS_FINDINGS: FULL

## 2025-06-12 ASSESSMENT — TONOMETRY
OS_IOP_MMHG: 17
OD_IOP_MMHG: 17

## 2025-06-12 ASSESSMENT — LID EXAM ASSESSMENTS
OS_BLEPHARITIS: LLL LUL 1+
OD_BLEPHARITIS: RLL RUL 1+

## 2025-06-12 ASSESSMENT — LID POSITION - DERMATOCHALASIS
OD_DERMATOCHALASIS: RUL
OS_DERMATOCHALASIS: LUL